# Patient Record
Sex: FEMALE
[De-identification: names, ages, dates, MRNs, and addresses within clinical notes are randomized per-mention and may not be internally consistent; named-entity substitution may affect disease eponyms.]

---

## 2017-02-11 ENCOUNTER — HOSPITAL ENCOUNTER (EMERGENCY)
Dept: HOSPITAL 5 - ED | Age: 62
Discharge: LEFT BEFORE DISCHARGE | End: 2017-02-11
Payer: MEDICAID

## 2017-02-11 VITALS — DIASTOLIC BLOOD PRESSURE: 109 MMHG | SYSTOLIC BLOOD PRESSURE: 154 MMHG

## 2017-02-11 DIAGNOSIS — R53.1: Primary | ICD-10-CM

## 2017-02-11 DIAGNOSIS — R11.10: ICD-10-CM

## 2017-02-11 DIAGNOSIS — Z53.21: ICD-10-CM

## 2017-05-13 ENCOUNTER — HOSPITAL ENCOUNTER (EMERGENCY)
Dept: HOSPITAL 5 - ED | Age: 62
Discharge: HOME | End: 2017-05-13
Payer: MEDICAID

## 2017-05-13 VITALS — SYSTOLIC BLOOD PRESSURE: 130 MMHG | DIASTOLIC BLOOD PRESSURE: 79 MMHG
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
## 2017-05-13 DIAGNOSIS — L29.9: ICD-10-CM

## 2017-05-13 DIAGNOSIS — L03.90: Primary | ICD-10-CM

## 2017-05-13 DIAGNOSIS — L30.9: ICD-10-CM

## 2017-05-13 PROCEDURE — 99282 EMERGENCY DEPT VISIT SF MDM: CPT

## 2017-05-13 NOTE — EMERGENCY DEPARTMENT REPORT
ED Animal Bite HPI





- General


Chief Complaint: Animal Bite


Stated Complaint: SEVERE SKIN CONDITION


Time Seen by Provider: 05/13/17 11:11


Source: patient


Mode of arrival: Ambulatory


Limitations: No Limitations





- History of Present Illness


Initial Comments: 





Patient here reported that she has bug bites all over.  She said it started 1 

month ago.  Complain of itching.  Patient said his arms, round worms and whip 

worms.  She was referred to a dermatologist in the past that she did not follow-

up.  Eyes any respiratory symptoms to include cough, wheezing, stridor, 

shortness of breath or chest pain.  Denies any difficulty swallowing or sore 

throat.  Denies any fever or chill or chest pain.  Denies any nausea vomiting.  

She denies pain at present.


MD Complaint: animal bite


-: month(s)


Location: other (generalized.)


Animal: other (unknown bugs)


Animal Control Notified: No


Description: unknown animal (drugs), immunizations UTD, appeared well


Mechanism: bite, scratch


Severity scale (0 -10): 0


Context: possible exposure while a


Associated Symptoms: erythema, rash.  denies: discharge from wound, bleeding, 

fever, chills, loss of consciousness, cough, headache, diaphoresis, shortness 

of breath


Treatments Prior to Arrival: antibiotic ointment





- Related Data


Patient Tetanus UTD: Yes


 Home Medications











 Medication  Instructions  Recorded  Confirmed  Last Taken


 


ALPRAZolam [Xanax TAB] 0.5 mg PO BID PRN 07/23/15 01/18/16 1 Day Ago








 Previous Rx's











 Medication  Instructions  Recorded  Last Taken  Type


 


Hydromorphone HCl [Dilaudid] 4 mg PO QID PRN #4 tablet 01/18/16 Unknown Rx


 


Oxycodone HCl [Oxycontin] 30 mg PO BID PRN #4 tab.er.12h 01/18/16 Unknown Rx


 


Promethazine [Phenergan TAB] 25 mg PO Q6HR PRN #10 tab 01/18/16 Unknown Rx


 


Ibuprofen [Motrin] 600 mg PO Q8H PRN #40 tablet 04/12/16 Unknown Rx


 


traMADol [Ultram] 50 mg PO Q6HR PRN #20 tablet 04/12/16 Unknown Rx


 


Sulfamethoxazole/Trimethoprim 1 each PO BID #20 tablet 11/29/16 Unknown Rx





[Bactrim DS TAB]    


 


Famotidine [Pepcid] 10 mg PO BID PRN #30 tablet 01/27/17 Unknown Rx


 


Hydrocortisone 1% [Hydrocortisone 1 applicatio TP TID #1 tube 01/27/17 Unknown 

Rx





1% CREAM]    


 


Hydroxyzine HCl 25 mg PO Q8H PRN #25 tablet 01/27/17 Unknown Rx


 


Ibuprofen [Motrin] 400 mg PO Q8H PRN #20 tablet 01/27/17 Unknown Rx


 


Mupirocin [Bactroban 2% CREAM] 1 applicatio TP TID #1 cream 01/27/17 Unknown Rx


 


Permethrin [Lice Cream Rinse] 120 ml TP QDAY #1 liquid 01/27/17 Unknown Rx


 


Sulfamethoxazole/Trimethoprim 1 each PO BID #14 tablet 01/27/17 Unknown Rx





[Bactrim DS TAB]    


 


Cephalexin [Keflex] 500 mg PO Q8HR #30 cap 05/13/17 Unknown Rx


 


Hydroxyzine HCl 25 mg PO Q8H PRN #21 tablet 05/13/17 Unknown Rx


 


predniSONE [Deltasone] 50 mg PO QDAY #5 tab 05/13/17 Unknown Rx











 Allergies











Allergy/AdvReac Type Severity Reaction Status Date / Time


 


No Known Allergies Allergy   Verified 05/13/17 09:44














ED Review of Systems


ROS: 


Stated complaint: SEVERE SKIN CONDITION


Other details as noted in HPI





Comment: All other systems reviewed and negative


Constitutional: denies: chills, fever


ENT: denies: ear pain, throat pain, dental pain, hearing loss, congestion


Respiratory: no symptoms reported


Cardiovascular: denies: chest pain, palpitations, dyspnea on exertion, edema, 

syncope


Endocrine: no symptoms reported


Gastrointestinal: denies: abdominal pain, nausea, vomiting


Musculoskeletal: denies: back pain, arthralgia, myalgia


Skin: rash, pruritus


Neurological: denies: headache, weakness, numbness, paresthesias, confusion, 

abnormal gait, vertigo





ED Past Medical Hx





- Past Medical History


Previous Medical History?: Yes


Hx Liver Disease: Yes (HEPATITIS C)


Hx Seizures: Yes


Additional medical history: fibromyalgia





- Surgical History


Past Surgical History?: Yes


Hx Cholecystectomy: Yes


Additional Surgical History: right arm surgery





- Family History


Family history: no significant





- Social History


Smoking Status: Never Smoker


Substance Use Type: None





- Medications


Home Medications: 


 Home Medications











 Medication  Instructions  Recorded  Confirmed  Last Taken  Type


 


ALPRAZolam [Xanax TAB] 0.5 mg PO BID PRN 07/23/15 01/18/16 1 Day Ago History


 


Hydromorphone HCl [Dilaudid] 4 mg PO QID PRN #4 tablet 01/18/16  Unknown Rx


 


Oxycodone HCl [Oxycontin] 30 mg PO BID PRN #4 tab.er.12h 01/18/16  Unknown Rx


 


Promethazine [Phenergan TAB] 25 mg PO Q6HR PRN #10 tab 01/18/16  Unknown Rx


 


Ibuprofen [Motrin] 600 mg PO Q8H PRN #40 tablet 04/12/16  Unknown Rx


 


traMADol [Ultram] 50 mg PO Q6HR PRN #20 tablet 04/12/16  Unknown Rx


 


Sulfamethoxazole/Trimethoprim 1 each PO BID #20 tablet 11/29/16  Unknown Rx





[Bactrim DS TAB]     


 


Famotidine [Pepcid] 10 mg PO BID PRN #30 tablet 01/27/17  Unknown Rx


 


Hydrocortisone 1% [Hydrocortisone 1 applicatio TP TID #1 tube 01/27/17  Unknown 

Rx





1% CREAM]     


 


Hydroxyzine HCl 25 mg PO Q8H PRN #25 tablet 01/27/17  Unknown Rx


 


Ibuprofen [Motrin] 400 mg PO Q8H PRN #20 tablet 01/27/17  Unknown Rx


 


Mupirocin [Bactroban 2% CREAM] 1 applicatio TP TID #1 cream 01/27/17  Unknown Rx


 


Permethrin [Lice Cream Rinse] 120 ml TP QDAY #1 liquid 01/27/17  Unknown Rx


 


Sulfamethoxazole/Trimethoprim 1 each PO BID #14 tablet 01/27/17  Unknown Rx





[Bactrim DS TAB]     


 


Cephalexin [Keflex] 500 mg PO Q8HR #30 cap 05/13/17  Unknown Rx


 


Hydroxyzine HCl 25 mg PO Q8H PRN #21 tablet 05/13/17  Unknown Rx


 


predniSONE [Deltasone] 50 mg PO QDAY #5 tab 05/13/17  Unknown Rx














ED Physical Exam





- General


Limitations: No Limitations


General appearance: alert, in no apparent distress





- Head


Head exam: Present: atraumatic, normocephalic, normal inspection





- Eye


Eye exam: Present: normal appearance, PERRL, EOMI.  Absent: scleral icterus, 

conjunctival injection, periorbital swelling, periorbital tenderness


Pupils: Present: normal accommodation





- ENT


ENT exam: Present: normal exam, normal orophraynx, mucous membranes moist, TM's 

normal bilaterally, normal external ear exam





- Neck


Neck exam: Present: normal inspection, full ROM.  Absent: tenderness, 

lymphadenopathy





- Respiratory


Respiratory exam: Present: normal lung sounds bilaterally.  Absent: respiratory 

distress, chest wall tenderness





- Cardiovascular


Cardiovascular Exam: Present: regular rate, normal rhythm, normal heart sounds





- GI/Abdominal


GI/Abdominal exam: Present: soft, normal bowel sounds.  Absent: distended, 

tenderness, guarding, rebound, rigid





- Extremities Exam


Extremities exam: Present: normal inspection, full ROM, normal capillary 

refill.  Absent: tenderness, pedal edema, joint swelling, calf tenderness





- Neurological Exam


Neurological exam: Present: alert, oriented X3, normal gait





- Psychiatric


Psychiatric exam: Present: normal affect, normal mood





- Skin


Skin exam: Present: warm, dry, erythema





- Expanded Skin Exam


  ** Expanded


Type of lesion: Present: rash, bite/sting


Distribution of rash: generalized (generalized erythema areas noted to body 

surface.  Sparsely scattered in the areas.  No facial or neck involvement.)


Description of rash: Present: tenderness, erythematous.  Absent: swelling, 

vesicular, blisters, urticarial, crusting, discharge, fluctuant, indurated





ED Course


 Vital Signs











  05/13/17 05/13/17





  09:30 09:40


 


Temperature 97.9 F 97.9 F


 


Pulse Rate 68 68


 


Respiratory 18 18





Rate  


 


Blood Pressure  151/92


 


Blood Pressure 151/92 





[Right]  


 


O2 Sat by Pulse 95 95





Oximetry  














- Reevaluation(s)


Reevaluation #1: 





05/13/17 11:39


Patient stable in the emergency room.


Critical care attestation.: 


If time is entered above; I have spent that time in minutes in the direct care 

of this critically ill patient, excluding procedure time.








ED Disposition


Clinical Impression: 


 Dermatitis, Pruritic dermatitis





Cellulitis


Qualifiers:


 Site of cellulitis: unspecified site Qualified Code(s): L03.90 - Cellulitis, 

unspecified





Bug bite with infection


Qualifiers:


 Encounter type: subsequent encounter Qualified Code(s): W57.XXXD - Bitten or 

stung by nonvenomous insect and other nonvenomous arthropods, subsequent 

encounter





Disposition: DISCHARGED TO HOME OR SELFCARE


Is pt being admited?: No


Does the pt Need Aspirin: No


Condition: Stable


Instructions:  Animal Bite (ED), Contact Dermatitis (ED), Itchy Skin (ED)


Additional Instructions: 


Please keep affected area clean and dry.


Take antibiotic as prescribed.


 Follow Up with dermatologist as referred.


Prescriptions: 


Cephalexin [Keflex] 500 mg PO Q8HR #30 cap


Hydroxyzine HCl 25 mg PO Q8H PRN #21 tablet


 PRN Reason: Itching


predniSONE [Deltasone] 50 mg PO QDAY #5 tab


Referrals: 


PRIMARY CARE,MD [Primary Care Provider] - 3-5 Days





ED Medical Decision Making





- Medical Decision Making





ED course: I discussed with  patient treatment plan and diagnosis. I discussed  

that she will need to follow-up with dermatologist we will refer her to.  

Patient with acute rash that appears from bug bite some areas are cellulitic.  

Patient was understanding of discharge instruction and discharged home with 

prescription for prednisone, Keflex and hydroxyzine.

## 2018-08-17 ENCOUNTER — HOSPITAL ENCOUNTER (EMERGENCY)
Dept: HOSPITAL 5 - ED | Age: 63
Discharge: LEFT BEFORE DISCHARGE | End: 2018-08-17
Payer: MEDICAID

## 2018-08-17 DIAGNOSIS — Z53.21: ICD-10-CM

## 2018-08-17 DIAGNOSIS — R03.0: Primary | ICD-10-CM

## 2018-09-20 ENCOUNTER — HOSPITAL ENCOUNTER (EMERGENCY)
Dept: HOSPITAL 5 - ED | Age: 63
Discharge: HOME | End: 2018-09-20
Payer: MEDICAID

## 2018-09-20 VITALS — SYSTOLIC BLOOD PRESSURE: 150 MMHG | DIASTOLIC BLOOD PRESSURE: 60 MMHG
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
## 2018-09-20 DIAGNOSIS — S63.502A: Primary | ICD-10-CM

## 2018-09-20 DIAGNOSIS — F12.10: ICD-10-CM

## 2018-09-20 DIAGNOSIS — M79.7: ICD-10-CM
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
## 2018-09-20 DIAGNOSIS — Z90.49: ICD-10-CM

## 2018-09-20 DIAGNOSIS — Z87.891: ICD-10-CM

## 2018-09-20 NOTE — XRAY REPORT
FINAL REPORT



EXAM:  XR WRIST 2V LT



HISTORY:  s/p fall hurt wrist 



COMPARISONS:  None.



FINDINGS:  

AP and lateral views left wrist 



Os ulnar styloidium. Ulnar styloid lucencies. There is a 4

millimeter lucency in the center of the lunate bone. No

chondrocalcinosis, periosteal reaction or erosive arthropathy

identified. Carpal arcs are intact. No acute fracture or gross

malalignment. 



IMPRESSION:  

No acute fracture or gross malalignment. 



Ulnar styloid findings may be sequela of prior fracture.

## 2018-09-20 NOTE — EMERGENCY DEPARTMENT REPORT
ED Upper Extremity Inj HPI





- General


Chief Complaint: Extremity Injury, Upper


Stated Complaint: WRIST PAIN


Source: patient


Mode of arrival: Ambulatory


Limitations: No Limitations





- History of Present Illness


Initial Comments: 





This is a 62-year-old female that presents with left wrist pain status post 

fall yesterday.  Patient states she was tripped over an extension cord at home 

and landed on her left wrist.  Her wrist twisted on landing.  She reports 

swelling in to lateral side of her wrist with pain with range of motion.  

Patient reports pain is 10 out of 10 on pain scale and worse with movement.  

She denies fever, numbness or tingling, deformity, and erythema.


MD Complaint: Injury to:: left, wrist


Onset/Timin


-: days(s)


Other Injuries: none


Handedness: right


Place: home


Severity scale (0 -10): 10


Improves With: none


Worsens With: movement of extremity


Context: fall


Associated Symptoms: denies other symptoms


Treatments Prior to Arrival: cold therapy





- Related Data


 Home Medications











 Medication  Instructions  Recorded  Confirmed  Last Taken


 


ALPRAZolam [Xanax TAB] 0.5 mg PO BID PRN 07/23/15 01/18/16 1 Day Ago





    ~16








 Previous Rx's











 Medication  Instructions  Recorded  Last Taken  Type


 


Hydromorphone HCl [Dilaudid] 4 mg PO QID PRN #4 tablet 16 Unknown Rx


 


Oxycodone HCl [Oxycontin] 30 mg PO BID PRN #4 tab.er.12h 16 Unknown Rx


 


Promethazine [Phenergan TAB] 25 mg PO Q6HR PRN #10 tab 16 Unknown Rx


 


Ibuprofen [Motrin] 600 mg PO Q8H PRN #40 tablet 16 Unknown Rx


 


traMADol [Ultram] 50 mg PO Q6HR PRN #20 tablet 16 Unknown Rx


 


Sulfamethoxazole/Trimethoprim 1 each PO BID #20 tablet 16 Unknown Rx





[Bactrim DS TAB]    


 


Famotidine [Pepcid] 10 mg PO BID PRN #30 tablet 17 Unknown Rx


 


Hydrocortisone 1% [Hydrocortisone 1 applicatio TP TID #1 tube 17 Unknown 

Rx





1% CREAM]    


 


Ibuprofen [Motrin] 400 mg PO Q8H PRN #20 tablet 17 Unknown Rx


 


Mupirocin [Bactroban 2% CREAM] 1 applicatio TP TID #1 cream 17 Unknown Rx


 


Permethrin [Lice Cream Rinse] 120 ml TP QDAY #1 liquid 17 Unknown Rx


 


Sulfamethoxazole/Trimethoprim 1 each PO BID #14 tablet 17 Unknown Rx





[Bactrim DS TAB]    


 


hydrOXYzine HCl [Hydroxyzine HCl] 25 mg PO Q8H PRN #25 tablet 17 Unknown 

Rx


 


cephALEXin [Keflex] 500 mg PO Q8HR #30 cap 17 Unknown Rx


 


hydrOXYzine HCl [Hydroxyzine HCl] 25 mg PO Q8H PRN #21 tablet 17 Unknown 

Rx


 


predniSONE [Deltasone] 50 mg PO QDAY #5 tab 17 Unknown Rx


 


Ibuprofen [Motrin 800 MG tab] 800 mg PO Q8HR PRN #12 tablet 18 Unknown Rx











 Allergies











Allergy/AdvReac Type Severity Reaction Status Date / Time


 


No Known Allergies Allergy   Verified 17 09:44














ED Review of Systems


ROS: 


Stated complaint: WRIST PAIN


Other details as noted in HPI





Constitutional: denies: chills, fever


Respiratory: denies: cough, shortness of breath, wheezing


Cardiovascular: denies: chest pain, palpitations





ED Past Medical Hx





- Past Medical History


Previous Medical History?: Yes


Hx Liver Disease: Yes (HEPATITIS C)


Hx Seizures: Yes


Additional medical history: fibromyalgia





- Surgical History


Past Surgical History?: Yes


Hx Cholecystectomy: Yes


Additional Surgical History: right arm surgery





- Social History


Smoking Status: Former Smoker


Substance Use Type: Marijuana





- Medications


Home Medications: 


 Home Medications











 Medication  Instructions  Recorded  Confirmed  Last Taken  Type


 


ALPRAZolam [Xanax TAB] 0.5 mg PO BID PRN 07/23/15 01/18/16 1 Day Ago History





    ~16 


 


Hydromorphone HCl [Dilaudid] 4 mg PO QID PRN #4 tablet 16  Unknown Rx


 


Oxycodone HCl [Oxycontin] 30 mg PO BID PRN #4 tab.er.12h 16  Unknown Rx


 


Promethazine [Phenergan TAB] 25 mg PO Q6HR PRN #10 tab 16  Unknown Rx


 


Ibuprofen [Motrin] 600 mg PO Q8H PRN #40 tablet 16  Unknown Rx


 


traMADol [Ultram] 50 mg PO Q6HR PRN #20 tablet 16  Unknown Rx


 


Sulfamethoxazole/Trimethoprim 1 each PO BID #20 tablet 16  Unknown Rx





[Bactrim DS TAB]     


 


Famotidine [Pepcid] 10 mg PO BID PRN #30 tablet 17  Unknown Rx


 


Hydrocortisone 1% [Hydrocortisone 1 applicatio TP TID #1 tube 17  Unknown 

Rx





1% CREAM]     


 


Ibuprofen [Motrin] 400 mg PO Q8H PRN #20 tablet 17  Unknown Rx


 


Mupirocin [Bactroban 2% CREAM] 1 applicatio TP TID #1 cream 17  Unknown Rx


 


Permethrin [Lice Cream Rinse] 120 ml TP QDAY #1 liquid 17  Unknown Rx


 


Sulfamethoxazole/Trimethoprim 1 each PO BID #14 tablet 17  Unknown Rx





[Bactrim DS TAB]     


 


hydrOXYzine HCl [Hydroxyzine HCl] 25 mg PO Q8H PRN #25 tablet 17  Unknown 

Rx


 


cephALEXin [Keflex] 500 mg PO Q8HR #30 cap 17  Unknown Rx


 


hydrOXYzine HCl [Hydroxyzine HCl] 25 mg PO Q8H PRN #21 tablet 17  Unknown 

Rx


 


predniSONE [Deltasone] 50 mg PO QDAY #5 tab 17  Unknown Rx


 


Ibuprofen [Motrin 800 MG tab] 800 mg PO Q8HR PRN #12 tablet 18  Unknown Rx














ED Physical Exam





- General


Limitations: No Limitations


General appearance: alert, in no apparent distress





- Respiratory


Respiratory exam: Present: normal lung sounds bilaterally.  Absent: respiratory 

distress





- Cardiovascular


Cardiovascular Exam: Present: regular rate, normal rhythm.  Absent: systolic 

murmur, diastolic murmur, rubs, gallop





- GI/Abdominal


GI/Abdominal exam: Present: soft, normal bowel sounds





- Expanded Upper Extremity Exam


  ** Left


Shoulder Exam: Present: normal inspection, full ROM


Upper Arm exam: Present: normal inspection, full ROM


Elbow exam: Present: normal inspection, full ROM


Forearm Wrist exam: Present: normal inspection, full ROM


Hand Wrist exam: Present: tenderness (tenderness and swelling over scaphoid), 

swelling.  Absent: full ROM (Limited range of motion secondary pain), laceration

, ecchymosis, deformity, crepidus, dislocation, erythema, amputation, nail 

avulsion, subungual hematoma


Neuro motor exam: Present: wrist extension intact, thumb opposition intact, 

thumb IP flexion intact, thumb adduction intact, fingers 2-5 abduction intact





- Neurological Exam


Neurological exam: Present: alert, oriented X3





- Psychiatric


Psychiatric exam: Present: normal affect, normal mood





- Skin


Skin exam: Present: warm, dry, intact, normal color.  Absent: rash





ED Course





 Vital Signs











  18





  03:35


 


Temperature 98.6 F


 


Pulse Rate 71


 


Respiratory 16





Rate 


 


Blood Pressure 151/66


 


O2 Sat by Pulse 97





Oximetry 














ED Medical Decision Making





- Radiology Data


Radiology results: report reviewed, image reviewed





- Medical Decision Making





Patient was examined by me in the emergency room.  


Vitals are normal and patient is in no acute distress.  


Obtained x-ray of left wrist.  X-rays dictated by radiologist and report 

reviewed by myself.


No acute process or gross malaligment.  Ulnar styloid findings may be sequela 

of prior fracture.


Patient informed of results.  


Start ibuprofen for pain.  Reviewed RX history on [facility], patient received 120 

oxycodone pills on 18.  She was informed to take prescribed medication for 

pain.


Referral to orthopedic surgery for continuous care. 


Plan discussed with patient to discharge home and treat outpatient. She agrees 

with ER plan. 


Patient discharged home in stable condition. 


Follow up with PCP in 2-3 days.


Critical care attestation.: 


If time is entered above; I have spent that time in minutes in the direct care 

of this critically ill patient, excluding procedure time.








ED Disposition


Clinical Impression: 


 Left wrist pain





Fall


Qualifiers:


 Encounter type: initial encounter Qualified Code(s): W19.XXXA - Unspecified 

fall, initial encounter





Sprain of wrist, left


Qualifiers:


 Encounter type: initial encounter Qualified Code(s): S63.502A - Unspecified 

sprain of left wrist, initial encounter





Disposition:  TO HOME OR SELFCARE


Is pt being admited?: No


Does the pt Need Aspirin: No


Condition: Stable


Instructions:  Wrist Injury (ED), Wrist Sprain (ED)


Additional Instructions: 


Rest


Use ice or heat on affected area for 20 minutes and off for 2 hours.


Take pain medication every 6 hours as needed for pain.


Follow up with Primary Care Provider in 2-3 days.


Prescriptions: 


Ibuprofen [Motrin 800 MG tab] 800 mg PO Q8HR PRN #12 tablet


 PRN Reason: Pain, Moderate (4-6)


Referrals: 


COURTNEY RDZ MD [Staff Physician] - 3-5 Days


[facility] [Provider Group] - 3-5 Days


TATIANA LEAHY JR, MD [Staff Physician] - 3-5 Days


Forms:  Work/School Release Form(ED)


Time of Disposition: 09:29


Print Language: ENGLISH

## 2019-06-24 ENCOUNTER — HOSPITAL ENCOUNTER (EMERGENCY)
Dept: HOSPITAL 5 - ED | Age: 64
Discharge: HOME | End: 2019-06-24
Payer: MEDICAID

## 2019-06-24 VITALS — DIASTOLIC BLOOD PRESSURE: 73 MMHG | SYSTOLIC BLOOD PRESSURE: 147 MMHG

## 2019-06-24 DIAGNOSIS — Z77.22: ICD-10-CM

## 2019-06-24 DIAGNOSIS — J01.90: ICD-10-CM

## 2019-06-24 DIAGNOSIS — Z79.899: ICD-10-CM

## 2019-06-24 DIAGNOSIS — Z90.49: ICD-10-CM

## 2019-06-24 DIAGNOSIS — J20.9: Primary | ICD-10-CM

## 2019-06-24 DIAGNOSIS — I10: ICD-10-CM

## 2019-06-24 PROCEDURE — 99283 EMERGENCY DEPT VISIT LOW MDM: CPT

## 2019-06-24 PROCEDURE — 94640 AIRWAY INHALATION TREATMENT: CPT

## 2019-06-24 PROCEDURE — 71046 X-RAY EXAM CHEST 2 VIEWS: CPT

## 2019-06-24 NOTE — XRAY REPORT
ROUTINE CHEST, TWO VIEWS:



HISTORY:  Productive cough, wheezing.



The trachea, heart, mediastinal contour, lung fields and bony thorax 

are unremarkable. Moderate thoracic spondylosis is noted.



IMPRESSION:

Unremarkable chest x-ray.

## 2019-06-24 NOTE — EMERGENCY DEPARTMENT REPORT
- General


Chief Complaint: Upper Respiratory Infection


Stated Complaint: COUGH


Time Seen by Provider: 06/24/19 09:59


Source: patient


Mode of arrival: Ambulatory


Limitations: No Limitations





- History of Present Illness


Initial Comments: 





Patient is a 63-year-old  female who has a past medical history of 

significant secondhand smoke exposure as well as hypertension who is presenting 

with cough and congestion.  Patient has had a cough for the last 3 days.  

Patient states she has been around several children remove the nail.  Patient's 

cough productive of green sputum.  Patient is reporting shortness of breath and 

wheezing with a frontal headache and nasal congestion.  Patient states that she 

has had some gagging and nausea vomiting as well.


Severity scale (0 -10): 4


Quality: dull, aching (mid face)


Consistency: constant





- Related Data


                                Home Medications











 Medication  Instructions  Recorded  Confirmed  Last Taken


 


ALPRAZolam [Xanax TAB] 0.5 mg PO BID PRN 07/23/15 01/18/16 1 Day Ago





    ~01/17/16








                                  Previous Rx's











 Medication  Instructions  Recorded  Last Taken  Type


 


Hydromorphone HCl [Dilaudid] 4 mg PO QID PRN #4 tablet 01/18/16 Unknown Rx


 


Oxycodone HCl [oxyCONTIN ER] 30 mg PO BID PRN #4 tab.er.12h 01/18/16 Unknown Rx


 


Promethazine [Phenergan TAB] 25 mg PO Q6HR PRN #10 tab 01/18/16 Unknown Rx


 


Ibuprofen [Motrin] 600 mg PO Q8H PRN #40 tablet 04/12/16 Unknown Rx


 


traMADol [Ultram] 50 mg PO Q6HR PRN #20 tablet 04/12/16 Unknown Rx


 


Sulfamethoxazole/Trimethoprim 1 each PO BID #20 tablet 11/29/16 Unknown Rx





[Bactrim DS TAB]    


 


Famotidine [Pepcid] 10 mg PO BID PRN #30 tablet 01/27/17 Unknown Rx


 


Hydrocortisone 1% [Hydrocortisone 1 applicatio TP TID #1 tube 01/27/17 Unknown 

Rx





1% CREAM]    


 


Ibuprofen [Motrin] 400 mg PO Q8H PRN #20 tablet 01/27/17 Unknown Rx


 


Mupirocin [Bactroban 2% CREAM] 1 applicatio TP TID #1 cream 01/27/17 Unknown Rx


 


Permethrin [Lice Cream Rinse] 120 ml TP QDAY #1 liquid 01/27/17 Unknown Rx


 


Sulfamethoxazole/Trimethoprim 1 each PO BID #14 tablet 01/27/17 Unknown Rx





[Bactrim DS TAB]    


 


hydrOXYzine HCl [Hydroxyzine HCl] 25 mg PO Q8H PRN #25 tablet 01/27/17 Unknown 

Rx


 


cephALEXin [Keflex] 500 mg PO Q8HR #30 cap 05/13/17 Unknown Rx


 


hydrOXYzine HCl [Hydroxyzine HCl] 25 mg PO Q8H PRN #21 tablet 05/13/17 Unknown 

Rx


 


predniSONE [Deltasone] 50 mg PO QDAY #5 tab 05/13/17 Unknown Rx


 


Ibuprofen [Motrin 800 MG tab] 800 mg PO Q8HR PRN #12 tablet 09/20/18 Unknown Rx


 


Clindamycin [Clindamycin CAP] 300 mg PO Q8H 10 Days #30 cap 01/05/19 Unknown Rx


 


Permethrin 5% [Acticin 5% CREAM] 1 applicatio TP ONCE 1 Days #1 tube 01/05/19 

Unknown Rx


 


hydrOXYzine HCL [Atarax] 25 mg PO Q6HR PRN #12 tablet 01/05/19 Unknown Rx


 


ALBUTEROL Inhaler (OR & NICU) 2 puff IH QID PRN #1 inhalation 06/24/19 Unknown 

Rx





[ProAir HFA Inhaler]    


 


Amoxicillin/Potassium Clav 1 each PO BID #14 tablet 06/24/19 Unknown Rx





[Augmentin 875-125 Tablet]    


 


Benzonatate [Tessalon Perles] 100 mg PO Q8HR #10 capsule 06/24/19 Unknown Rx


 


Fluticasone [Flonase] 1 spray NS QDAY #1 bottle 06/24/19 Unknown Rx


 


predniSONE [Deltasone] 20 mg PO QDAY #5 tab 06/24/19 Unknown Rx











                                    Allergies











Allergy/AdvReac Type Severity Reaction Status Date / Time


 


No Known Allergies Allergy   Verified 06/24/19 09:29














ED Review of Systems


ROS: 


Stated complaint: COUGH


Other details as noted in HPI





Comment: All other systems reviewed and negative





ED Past Medical Hx





- Past Medical History


Previous Medical History?: Yes


Hx Hypertension: Yes


Hx Liver Disease: Yes (HEPATITIS C)


Hx Seizures: Yes


Additional medical history: fibromyalgia





- Surgical History


Past Surgical History?: Yes


Hx Cholecystectomy: Yes


Additional Surgical History: right arm surgery





- Social History


Smoking Status: Never Smoker


Substance Use Type: None





- Medications


Home Medications: 


                                Home Medications











 Medication  Instructions  Recorded  Confirmed  Last Taken  Type


 


ALPRAZolam [Xanax TAB] 0.5 mg PO BID PRN 07/23/15 01/18/16 1 Day Ago History





    ~01/17/16 


 


Hydromorphone HCl [Dilaudid] 4 mg PO QID PRN #4 tablet 01/18/16  Unknown Rx


 


Oxycodone HCl [oxyCONTIN ER] 30 mg PO BID PRN #4 tab.er.12h 01/18/16  Unknown Rx


 


Promethazine [Phenergan TAB] 25 mg PO Q6HR PRN #10 tab 01/18/16  Unknown Rx


 


Ibuprofen [Motrin] 600 mg PO Q8H PRN #40 tablet 04/12/16  Unknown Rx


 


traMADol [Ultram] 50 mg PO Q6HR PRN #20 tablet 04/12/16  Unknown Rx


 


Sulfamethoxazole/Trimethoprim 1 each PO BID #20 tablet 11/29/16  Unknown Rx





[Bactrim DS TAB]     


 


Famotidine [Pepcid] 10 mg PO BID PRN #30 tablet 01/27/17  Unknown Rx


 


Hydrocortisone 1% [Hydrocortisone 1 applicatio TP TID #1 tube 01/27/17  Unknown 

Rx





1% CREAM]     


 


Ibuprofen [Motrin] 400 mg PO Q8H PRN #20 tablet 01/27/17  Unknown Rx


 


Mupirocin [Bactroban 2% CREAM] 1 applicatio TP TID #1 cream 01/27/17  Unknown Rx


 


Permethrin [Lice Cream Rinse] 120 ml TP QDAY #1 liquid 01/27/17  Unknown Rx


 


Sulfamethoxazole/Trimethoprim 1 each PO BID #14 tablet 01/27/17  Unknown Rx





[Bactrim DS TAB]     


 


hydrOXYzine HCl [Hydroxyzine HCl] 25 mg PO Q8H PRN #25 tablet 01/27/17  Unknown 

Rx


 


cephALEXin [Keflex] 500 mg PO Q8HR #30 cap 05/13/17  Unknown Rx


 


hydrOXYzine HCl [Hydroxyzine HCl] 25 mg PO Q8H PRN #21 tablet 05/13/17  Unknown 

Rx


 


predniSONE [Deltasone] 50 mg PO QDAY #5 tab 05/13/17  Unknown Rx


 


Ibuprofen [Motrin 800 MG tab] 800 mg PO Q8HR PRN #12 tablet 09/20/18  Unknown Rx


 


Clindamycin [Clindamycin CAP] 300 mg PO Q8H 10 Days #30 cap 01/05/19  Unknown Rx


 


Permethrin 5% [Acticin 5% CREAM] 1 applicatio TP ONCE 1 Days #1 tube 01/05/19  

Unknown Rx


 


hydrOXYzine HCL [Atarax] 25 mg PO Q6HR PRN #12 tablet 01/05/19  Unknown Rx


 


ALBUTEROL Inhaler (OR & NICU) 2 puff IH QID PRN #1 inhalation 06/24/19  Unknown 

Rx





[ProAir HFA Inhaler]     


 


Amoxicillin/Potassium Clav 1 each PO BID #14 tablet 06/24/19  Unknown Rx





[Augmentin 875-125 Tablet]     


 


Benzonatate [Tessalon Perles] 100 mg PO Q8HR #10 capsule 06/24/19  Unknown Rx


 


Fluticasone [Flonase] 1 spray NS QDAY #1 bottle 06/24/19  Unknown Rx


 


predniSONE [Deltasone] 20 mg PO QDAY #5 tab 06/24/19  Unknown Rx














ED Physical Exam





- General


Limitations: No Limitations


General appearance: alert, in no apparent distress





- Head


Head exam: Present: atraumatic, normocephalic





- Expanded Head Exam


  ** Expanded





                            __________________________














                            __________________________





 1 - tenderness








- Eye


Eye exam: Present: normal appearance





- ENT


ENT exam: Present: mucous membranes moist





- Neck


Neck exam: Present: normal inspection





- Respiratory


Respiratory exam: Present: normal lung sounds bilaterally, respiratory distress,

 wheezes, rhonchi.  Absent: rales, stridor, accessory muscle use





- Cardiovascular


Cardiovascular Exam: Present: regular rate, normal rhythm, normal heart sounds. 

 Absent: systolic murmur, diastolic murmur, rubs, gallop





- GI/Abdominal


GI/Abdominal exam: Present: soft, normal bowel sounds.  Absent: distended, 

tenderness, guarding, rebound





- Extremities Exam


Extremities exam: Present: normal inspection





- Back Exam


Back exam: Present: normal inspection





- Neurological Exam


Neurological exam: Present: alert, oriented X3





- Psychiatric


Psychiatric exam: Present: normal affect, normal mood





- Skin


Skin exam: Present: warm, dry, intact, normal color.  Absent: rash





ED Course





                                   Vital Signs











  06/24/19





  09:29


 


Temperature 97.8 F


 


Pulse Rate 78


 


Respiratory 18





Rate 


 


Blood Pressure 147/73


 


O2 Sat by Pulse 96





Oximetry 














ED Medical Decision Making





- Radiology Data





Chest x-ray shows no acute process





- Medical Decision Making





Patient received a nebulized treatment of albuterol and Atrovent which did 

improve her wheezing.  At time of discharge wheezing has resolved.  Patient 

likely with acute bronchitis.  Patient has a significant secondhand smoke 

exposure as her  smokes about 2 packs of cigarettes daily in the house.  

Patient treated as a smoker's bronchitis and be started on Augmentin.  This also

 will cover for her likely acute sinusitis as well.  Patient given several days 

of prednisone and as well as Flonase and albuterol inhaler.  Patient discharged 

in stable condition.


Critical care attestation.: 


If time is entered above; I have spent that time in minutes in the direct care 

of this critically ill patient, excluding procedure time.








ED Disposition


Clinical Impression: 


 Acute bronchitis with bronchospasm, Acute sinusitis





Disposition: DC-01 TO HOME OR SELFCARE


Is pt being admited?: No


Does the pt Need Aspirin: No


Condition: Stable


Instructions:  Acute Bronchitis (ED), Reactive Airways Disease (ED), Sinusitis 

(ED)


Referrals: 


DAWN QUEVEDO MD [Primary Care Provider] - 3-5 Days


Time of Disposition: 11:34

## 2019-07-08 ENCOUNTER — HOSPITAL ENCOUNTER (EMERGENCY)
Dept: HOSPITAL 5 - ED | Age: 64
Discharge: HOME | End: 2019-07-08
Payer: MEDICAID

## 2019-07-08 VITALS — DIASTOLIC BLOOD PRESSURE: 59 MMHG | SYSTOLIC BLOOD PRESSURE: 108 MMHG

## 2019-07-08 DIAGNOSIS — J40: Primary | ICD-10-CM

## 2019-07-08 DIAGNOSIS — Z87.891: ICD-10-CM

## 2019-07-08 DIAGNOSIS — Z90.49: ICD-10-CM

## 2019-07-08 DIAGNOSIS — Z86.19: ICD-10-CM

## 2019-07-08 DIAGNOSIS — M79.7: ICD-10-CM

## 2019-07-08 DIAGNOSIS — I10: ICD-10-CM

## 2019-07-08 DIAGNOSIS — Z79.899: ICD-10-CM

## 2019-07-08 LAB
ALBUMIN SERPL-MCNC: 3.4 G/DL (ref 3.9–5)
ALT SERPL-CCNC: 84 UNITS/L (ref 7–56)
BASOPHILS # (AUTO): 0.1 K/MM3 (ref 0–0.1)
BASOPHILS NFR BLD AUTO: 0.4 % (ref 0–1.8)
BUN SERPL-MCNC: 13 MG/DL (ref 7–17)
BUN/CREAT SERPL: 22 %
CALCIUM SERPL-MCNC: 9 MG/DL (ref 8.4–10.2)
EOSINOPHIL # BLD AUTO: 0 K/MM3 (ref 0–0.4)
EOSINOPHIL NFR BLD AUTO: 0.1 % (ref 0–4.3)
HCT VFR BLD CALC: 41.7 % (ref 30.3–42.9)
HEMOLYSIS INDEX: 9
HGB BLD-MCNC: 14.1 GM/DL (ref 10.1–14.3)
LYMPHOCYTES # BLD AUTO: 2.3 K/MM3 (ref 1.2–5.4)
LYMPHOCYTES NFR BLD AUTO: 12.1 % (ref 13.4–35)
MCHC RBC AUTO-ENTMCNC: 34 % (ref 30–34)
MCV RBC AUTO: 92 FL (ref 79–97)
MONOCYTES # (AUTO): 1.4 K/MM3 (ref 0–0.8)
MONOCYTES % (AUTO): 7.5 % (ref 0–7.3)
PLATELET # BLD: 92 K/MM3 (ref 140–440)
RBC # BLD AUTO: 4.53 M/MM3 (ref 3.65–5.03)

## 2019-07-08 PROCEDURE — 71046 X-RAY EXAM CHEST 2 VIEWS: CPT

## 2019-07-08 PROCEDURE — 82140 ASSAY OF AMMONIA: CPT

## 2019-07-08 PROCEDURE — 71250 CT THORAX DX C-: CPT

## 2019-07-08 PROCEDURE — 84484 ASSAY OF TROPONIN QUANT: CPT

## 2019-07-08 PROCEDURE — 93010 ELECTROCARDIOGRAM REPORT: CPT

## 2019-07-08 PROCEDURE — 94640 AIRWAY INHALATION TREATMENT: CPT

## 2019-07-08 PROCEDURE — 83735 ASSAY OF MAGNESIUM: CPT

## 2019-07-08 PROCEDURE — 93005 ELECTROCARDIOGRAM TRACING: CPT

## 2019-07-08 PROCEDURE — 85025 COMPLETE CBC W/AUTO DIFF WBC: CPT

## 2019-07-08 PROCEDURE — 87040 BLOOD CULTURE FOR BACTERIA: CPT

## 2019-07-08 PROCEDURE — 80053 COMPREHEN METABOLIC PANEL: CPT

## 2019-07-08 PROCEDURE — 36415 COLL VENOUS BLD VENIPUNCTURE: CPT

## 2019-07-08 NOTE — XRAY REPORT
PA AND LATERAL CXR



HISTORY: Cough.



COMPARISON: 6/24/2019 



FINDINGS:

Cardiomediastinal silhouette: Normal cardiac size.  Normal mediastinal contours.

Lungs: Normal expansion.  Normal lung aeration.  No pleural effusions.  No pneumothorax.

Pulmonary vascularity: Normal.



Support hardware: None.



Additional findings: None.



IMPRESSION:

1. No acute cardiopulmonary process.



Signer Name: Laz Palmer MD 

Signed: 7/8/2019 2:59 PM

 Workstation Name: [id number]

## 2019-07-08 NOTE — EVENT NOTE
ED Screening Note


ED Screening Note: 





Presents for a cough a week 


+sputum production 


states she has SOB


+fever


 states she has dark urine 





PMHx HTN, Hep C 





This initial assessment/diagnostic orders/clinical plan/treatment(s) is/are 

subject to change based on patients health status, clinical progression and re-

assessment by fellow clinical providers in the ED. Further treatment and workup 

at subsequent clinical providers discretion. Patient/guardian urged not to elope

from the ED as their condition may be serious if not clinically assessed and 

managed. 





Initial orders include: sepsis protocol

## 2019-07-08 NOTE — EMERGENCY DEPARTMENT REPORT
ED General Adult HPI





- General


Chief complaint: Dyspnea/Respdistress


Stated complaint: GRACE


Time Seen by Provider: 19 13:54


Source: patient, RN notes reviewed, old records reviewed


Mode of arrival: Ambulatory


Limitations: No Limitations





- History of Present Illness


Initial comments: 


This is a pleasant 63-year-old female who was recently admitted to this 

emergency room for presumed bronchitis.  She was treated with steroids, and





Antibiotics, and nebulizer therapy.





Past medical history includes hypertension, fibromyalgia, hepatitis C, and 

reported history of chronic tobacco exposure.  Patient indicates that apparently

her ex- smokes quite a bit around her.  Patient presents to the ER today 

with a complaint of persistent cough, mucus production, coughing up blood, green

nasal discharge, and green mucus.  Symptoms have basically been constant since 

her recent evaluation.  She denies physical pain and a discrete area, but 

endorses some body aches.  Symptoms do not radiate anywhere, and did not have 

exacerbating or relieving factors that she is aware.  Positive fever, positive 

cough, positive mucus production.


-: Gradual, days(s)


Consistency: constant


Improves with: none


Worsens with: none





- Related Data


                                Home Medications











 Medication  Instructions  Recorded  Confirmed  Last Taken


 


Mv-Mn/Folic Acid/Calcium/Vit K 1 each PO DAILY 19 Unknown





[Women's 50 Plus Multivit Tab]    


 


Potassium 99 mg PO QDAY 19 Unknown








                                  Previous Rx's











 Medication  Instructions  Recorded  Last Taken  Type


 


Albuterol Sulfate [Proair 90 mcg IH Q4HR PRN #2 aer.pow.ba 19 Unknown Rx





Respiclick]    


 


Magnesium Oxide [Mag-Ox] 400 mg PO BID #28 tablet 19 Unknown Rx


 


levoFLOXacin [Levaquin] 750 mg PO QDAY #10 tablet 19 Unknown Rx











                                    Allergies











Allergy/AdvReac Type Severity Reaction Status Date / Time


 


No Known Allergies Allergy   Verified 19 09:29














ED Review of Systems


ROS: 


Stated complaint: GRACE


Other details as noted in HPI





Constitutional: fever, malaise, other


ENT: congestion


Respiratory: cough


Cardiovascular: denies: syncope


Gastrointestinal: denies: vomiting


Genitourinary: denies: dysuria


Musculoskeletal: arthralgia, myalgia


Skin: denies: lesions


Neurological: weakness


Psychiatric: anxiety





ED Past Medical Hx





- Past Medical History


Previous Medical History?: Yes


Hx Hypertension: Yes


Hx Liver Disease: Yes (HEPATITIS C)


Hx Seizures: Yes


Additional medical history: fibromyalgia





- Surgical History


Past Surgical History?: Yes


Hx Cholecystectomy: Yes


Additional Surgical History: right arm surgery





- Social History


Smoking Status: Never Smoker


Substance Use Type: None





- Medications


Home Medications: 


                                Home Medications











 Medication  Instructions  Recorded  Confirmed  Last Taken  Type


 


Albuterol Sulfate [Proair 90 mcg IH Q4HR PRN #2 aer.pow.ba 19  Unknown Rx





Respiclick]     


 


Magnesium Oxide [Mag-Ox] 400 mg PO BID #28 tablet 19  Unknown Rx


 


Mv-Mn/Folic Acid/Calcium/Vit K 1 each PO DAILY 19 Unknown History





[Women's 50 Plus Multivit Tab]     


 


Potassium 99 mg PO QDAY 19 Unknown History


 


levoFLOXacin [Levaquin] 750 mg PO QDAY #10 tablet 19  Unknown Rx














ED Physical Exam





- General


Limitations: No Limitations


General appearance: alert, anxious





- Head


Head exam: Present: atraumatic, normocephalic





- Eye


Eye exam: Present: normal appearance, EOMI.  Absent: nystagmus





- ENT


ENT exam: Present: normal exam, normal orophraynx, mucous membranes moist, 

normal external ear exam





- Neck


Neck exam: Present: normal inspection, full ROM.  Absent: tenderness, 

meningismus





- Respiratory


Respiratory exam: Present: rhonchi.  Absent: respiratory distress





- Cardiovascular


Cardiovascular Exam: Present: regular rate, normal rhythm, normal heart sounds. 

 Absent: bradycardia, tachycardia, irregular rhythm, systolic murmur, diastolic 

murmur, rubs, gallop





- GI/Abdominal


GI/Abdominal exam: Present: soft.  Absent: distended, tenderness, guarding, 

rebound, rigid, pulsatile mass





- Extremities Exam


Extremities exam: Present: normal inspection, full ROM, other (2+ pulses noted 

in the bilateral upper, lower extremities.  Compartments soft.  No long bony 

tenderness.  The pelvis is stable.).  Absent: calf tenderness





- Back Exam


Back exam: Present: normal inspection, full ROM.  Absent: tenderness, CVA 

tenderness (R), CVA tenderness (L), paraspinal tenderness, vertebral tenderness





- Neurological Exam


Neurological exam: Present: alert, oriented X3, other (Extraocular movements 

intact.  Tongue midline.  No facial droop.  Facial sensation intact to light 

touch in the V1, V2, V3 distribution bilaterally.  5 and 5 strength in 4 

extremities..  Sensation is intact to light touch in 4 extremities.).  Absent: 

motor sensory deficit





- Psychiatric


Psychiatric exam: Present: anxious





- Skin


Skin exam: Present: warm, dry, intact, normal color.  Absent: rash





ED Course


                                   Vital Signs











  19





  13:55 14:54 15:00


 


Temperature 100.9 F H  


 


Pulse Rate 65  74


 


Pulse Rate [   





Anterior   





Bilateral   





Throughout]   


 


Respiratory 24  20





Rate   


 


Respiratory   





Rate [Anterior   





Bilateral   





Throughout]   


 


Blood Pressure 143/78  


 


Blood Pressure   





[Left]   


 


O2 Sat by Pulse 92 94 95





Oximetry   














  19





  15:30 15:50 15:51


 


Temperature   


 


Pulse Rate 83  


 


Pulse Rate [  63 64





Anterior   





Bilateral   





Throughout]   


 


Respiratory 24  





Rate   


 


Respiratory  18 19





Rate [Anterior   





Bilateral   





Throughout]   


 


Blood Pressure   


 


Blood Pressure   





[Left]   


 


O2 Sat by Pulse 95  





Oximetry   














  19





  16:00 16:30 17:00


 


Temperature   


 


Pulse Rate 79 91 H 75


 


Pulse Rate [   





Anterior   





Bilateral   





Throughout]   


 


Respiratory 19 15 19





Rate   


 


Respiratory   





Rate [Anterior   





Bilateral   





Throughout]   


 


Blood Pressure   


 


Blood Pressure   





[Left]   


 


O2 Sat by Pulse 93 97 95





Oximetry   














  19





  17:14 17:30 18:00


 


Temperature 99.8 F H  


 


Pulse Rate 78 74 69


 


Pulse Rate [   





Anterior   





Bilateral   





Throughout]   


 


Respiratory 20 14 28 H





Rate   


 


Respiratory   





Rate [Anterior   





Bilateral   





Throughout]   


 


Blood Pressure  120/65 119/69


 


Blood Pressure 120/65  





[Left]   


 


O2 Sat by Pulse 95 94 93





Oximetry   














  19





  18:32 19:00


 


Temperature  


 


Pulse Rate  65


 


Pulse Rate [  





Anterior  





Bilateral  





Throughout]  


 


Respiratory  12





Rate  


 


Respiratory  





Rate [Anterior  





Bilateral  





Throughout]  


 


Blood Pressure 100/56 100/46


 


Blood Pressure  





[Left]  


 


O2 Sat by Pulse 95 96





Oximetry  














- Reevaluation(s)


Reevaluation #1: 





19 15:59


Differential diagnosis, including but not limited to: Bronchitis, 

bronchiectasis, pneumonia,  anxiety





Assessment and plan: 63-year-old female with low-grade temperature, question 

initial tachycardia, initially called as a code sepsis, based off of the history

 and physical, I suspect that the patient is experiencing the natural expected 

history of bronchitis.  She has a low-grade temperature, and a second EKG shows 

a sinus rhythm at 73 bpm.  Initial EKG suggested a flutter with variable 

ventricular rate, I suspect the initial EKG is an error.  Patient so far whenl I

 have evaluated her, has not demonstrated any episodes of tachycardia that I 

personally witness.  We will treat her symptoms, and obtain noncontrast CT scan 

of the chest to exclude large masses or lesions, and obvious infectious 

etiology.


The patient does not have any pulmonary embolism risk factors, and she is low 

risk by well's criteria.  Her leukocytosis is reviewed and appreciated, this is 

likely a stress reaction or related to recent steroid burst.  Currently, the 

patient does not meet sepsis criteria.  She was called as a code sepsis in 

triage, which I suspect is an error.





Blood cultures were sent prior to my evaluation.  Based off of the current 

history and physical, do not suspect bacteremia, or invasive bacterial illness.











19 16:26





Reevaluation #2: 





19 19:29


The patient is reassessed.  Fever has resolved.  She is sleeping comfortably and

 in no acute distress.  She has consumed 0.5 L of water without difficulty.  CT 

scan of the chest is reviewed and appreciated.





IMPRESSION: 1. Nodular infiltrate at the lung bases appears inflammatory. 

Atypical organisms should be included in the differential. 2. Suspected 

splenomegaly. 





We will continue albuterol as needed, expanded antibiotic coverage to include 

Levaquin to cover atypical organisms, the patient is counseled to follow up with

 an outpatient primary care doctor or pulmonologist.  She indicates that she is 

reliable to follow-up.





ED Medical Decision Making





- Lab Data


Result diagrams: 


                                 19 13:59





                                 19 13:59








                                   Vital Signs











  19





  13:55 15:50 15:51


 


Temperature 100.9 F H  


 


Pulse Rate 65  


 


Pulse Rate [  63 64





Anterior   





Bilateral   





Throughout]   


 


Respiratory 24  





Rate   


 


Respiratory  18 19





Rate [Anterior   





Bilateral   





Throughout]   


 


Blood Pressure 143/78  


 


O2 Sat by Pulse 92  





Oximetry   











                                   Lab Results











  19 Range/Units





  13:59 13:59 13:59 


 


WBC  19.0 H    (4.5-11.0)  K/mm3


 


RBC  4.53    (3.65-5.03)  M/mm3


 


Hgb  14.1    (10.1-14.3)  gm/dl


 


Hct  41.7    (30.3-42.9)  %


 


MCV  92    (79-97)  fl


 


MCH  31    (28-32)  pg


 


MCHC  34    (30-34)  %


 


RDW  14.2    (13.2-15.2)  %


 


Plt Count  92 L    (140-440)  K/mm3


 


Lymph % (Auto)  12.1 L    (13.4-35.0)  %


 


Mono % (Auto)  7.5 H    (0.0-7.3)  %


 


Eos % (Auto)  0.1    (0.0-4.3)  %


 


Baso % (Auto)  0.4    (0.0-1.8)  %


 


Lymph #  2.3    (1.2-5.4)  K/mm3


 


Mono #  1.4 H    (0.0-0.8)  K/mm3


 


Eos #  0.0    (0.0-0.4)  K/mm3


 


Baso #  0.1    (0.0-0.1)  K/mm3


 


Seg Neutrophils %  79.9 H    (40.0-70.0)  %


 


Seg Neutrophils #  15.2 H    (1.8-7.7)  K/mm3


 


Sodium   133 L   (137-145)  mmol/L


 


Potassium   3.8   (3.6-5.0)  mmol/L


 


Chloride   98.7   ()  mmol/L


 


Carbon Dioxide   23   (22-30)  mmol/L


 


Anion Gap   15   mmol/L


 


BUN   13   (7-17)  mg/dL


 


Creatinine   0.6 L   (0.7-1.2)  mg/dL


 


Estimated GFR   > 60   ml/min


 


BUN/Creatinine Ratio   22   %


 


Glucose   151 H   ()  mg/dL


 


Lactic Acid    1.70  (0.7-2.0)  mmol/L


 


Calcium   9.0   (8.4-10.2)  mg/dL


 


Magnesium     (1.7-2.3)  mg/dL


 


Total Bilirubin   2.00 H   (0.1-1.2)  mg/dL


 


AST   70 H   (5-40)  units/L


 


ALT   84 H   (7-56)  units/L


 


Alkaline Phosphatase   133 H   ()  units/L


 


Troponin T   < 0.010   (0.00-0.029)  ng/mL


 


Total Protein   7.2   (6.3-8.2)  g/dL


 


Albumin   3.4 L   (3.9-5)  g/dL


 


Albumin/Globulin Ratio   0.9   %














  19 Range/Units





  13:59 


 


WBC   (4.5-11.0)  K/mm3


 


RBC   (3.65-5.03)  M/mm3


 


Hgb   (10.1-14.3)  gm/dl


 


Hct   (30.3-42.9)  %


 


MCV   (79-97)  fl


 


MCH   (28-32)  pg


 


MCHC   (30-34)  %


 


RDW   (13.2-15.2)  %


 


Plt Count   (140-440)  K/mm3


 


Lymph % (Auto)   (13.4-35.0)  %


 


Mono % (Auto)   (0.0-7.3)  %


 


Eos % (Auto)   (0.0-4.3)  %


 


Baso % (Auto)   (0.0-1.8)  %


 


Lymph #   (1.2-5.4)  K/mm3


 


Mono #   (0.0-0.8)  K/mm3


 


Eos #   (0.0-0.4)  K/mm3


 


Baso #   (0.0-0.1)  K/mm3


 


Seg Neutrophils %   (40.0-70.0)  %


 


Seg Neutrophils #   (1.8-7.7)  K/mm3


 


Sodium   (137-145)  mmol/L


 


Potassium   (3.6-5.0)  mmol/L


 


Chloride   ()  mmol/L


 


Carbon Dioxide   (22-30)  mmol/L


 


Anion Gap   mmol/L


 


BUN   (7-17)  mg/dL


 


Creatinine   (0.7-1.2)  mg/dL


 


Estimated GFR   ml/min


 


BUN/Creatinine Ratio   %


 


Glucose   ()  mg/dL


 


Lactic Acid   (0.7-2.0)  mmol/L


 


Calcium   (8.4-10.2)  mg/dL


 


Magnesium  1.60 L  (1.7-2.3)  mg/dL


 


Total Bilirubin   (0.1-1.2)  mg/dL


 


AST   (5-40)  units/L


 


ALT   (7-56)  units/L


 


Alkaline Phosphatase   ()  units/L


 


Troponin T   (0.00-0.029)  ng/mL


 


Total Protein   (6.3-8.2)  g/dL


 


Albumin   (3.9-5)  g/dL


 


Albumin/Globulin Ratio   %














- EKG Data


-: EKG Interpreted by Me


EKG shows normal: sinus rhythm


Rate: normal





- EKG Data





19 16:28








EKG shows a sinus rhythm, 73 bpm, normal axis, atrial enlargement, left v

entricular hypertrophy, poor R wave progression in V2, abnormal EKG, not 

consistent with ST elevation myocardial infarction.














- Radiology Data


Radiology results: report reviewed, image reviewed





Print Report











Referring Physician: DEREJE CARSON 


 


Patient Name: CHON FERNANDES 


 


Patient ID: [id number] 


 


YOB: 1955 


 


Sex: Female 


 


Accession: [id number] 


 


Report Date: 2019 


 


Report Status: Finalized 


 


Findings


[address] [address] 

XRay Report Signed Patient: CHON FERNANDES MR#: [id number] : 1955

Acct:[id number] Age/Sex: 63 / F ADM Date: 19 Loc: ED Attending Dr: CALLIE sampson Physician: ROSA M DEAL Date of Service: 19 Procedure(s): XR

chest routine 2V Accession Number(s): [id number] cc: ROSA M DEAL Fluoro Time

In Minutes: PA AND LATERAL CXR HISTORY: Cough. COMPARISON: 2019 FINDINGS: 

Cardiomediastinal silhouette: Normal cardiac size. Normal mediastinal contours. 

Lungs: Normal expansion. Normal lung aeration. No pleural effusions. No pneumot

horax. Pulmonary vascularity: Normal. Support hardware: None. Additional 

findings: None. IMPRESSION: 1. No acute cardiopulmonary process. Signer Name: 

Juan Landeros MD Signed: 2019 2:59 PM Workstation Name: [id number] 

Transcribed By: REF Dictated By: JUAN LANDEROS MD Electronically Authe

nticated By: JUAN LANDEROS MD Signed Date/Time: 19 [id number]   

















Print Report











Referring Physician: YUE PANDEY 


 


Patient Name: CHON FERNANDES 


 


Patient ID: [id number] 


 


YOB: 1955 


 


Sex: Female 


 


Accession: [id number] 


 


Report Date: 2019 


 


Report Status: Finalized 


 


Findings


[address] [address] Cat

Scan Report Signed Patient: CHON FERNANDES MR#: [id number] : 1955

Acct:[id number] Age/Sex: 63 / F ADM Date: 19 Loc: ED Attending Dr: 

Ordering Physician: YUE PANDEY MD Date of Service: 19 Procedure(s):

CT chest wo con Accession Number(s): [id number] cc: YUE PANDEY MD CT chest 

wo con INDICATION: cough wheezing hemoptysis. TECHNIQUE: All CT scans at this 

location are performed using the following dose modulation technique: Automated 

exposure control. CONTRAST: Chest x-ray 2019. COMPARISON: Chest x-ray 

earlier the same day. FINDINGS: Bronchial thickening is greatest dependently. 

There are nodular infiltrates at the bases dependently and to a lesser extent 

within the right middle lobe. Negative for dominant mass or pleural fluid. 

Negative for mediastinal mass or significant adenopathy. Imaging of the upper 

abdomen demonstrates previous cholecystectomy. The spleen is only partially 

imaged but appears enlarged. 








IMPRESSION: 1. Nodular infiltrate at the lung bases appears inflammatory. 

Atypical organisms should be included in the differential. 2. Suspected 

splenomegaly. Signer Name: En García MD Signed: 2019 7:04 PM 

Workstation Name: [id number] Transcribed By: ES Dictated By: En García MD Electronically Authenticated By: En García MD Signed 

Date/Time: 19   











Critical care attestation.: 


If time is entered above; I have spent that time in minutes in the direct care 

of this critically ill patient, excluding procedure time.








ED Disposition


Clinical Impression: 


 Bronchitis





Disposition: DC-01 TO HOME OR SELFCARE


Is pt being admited?: No


Does the pt Need Aspirin: No


Condition: Stable


Instructions:  Acute Bronchitis (ED)


Additional Instructions: 


Avoid exposure to tobacco smoke, and other forms of inhalational smoke.








Cultures were sent today, and results will be available in the next 3-5 days.  

Have a primary care doctor contact the medical records department to obtain 

culture results.





Take the magnesium supplementation as directed, albuterol as needed/directed, 

and levofloxacin antibiotic for the next 10 days as directed.  Avoid consumption

of alcohol.  Follow up with the pulmonary specialist within the next 10-14 days.

 Alternatively, patient may follow up with any of the listed primary care doctor

within the next 10-14 days.  Patient likely has bronchitis, which may be 

expected to last anywhere from 4-6 weeks.  There is typically no cure for bronch

itis.





Local pulmonary specialist include the following Echo: Melia Teran Orjioke








Return to the emergency room right away with new, worse or different symptoms, 

or symptoms not present on the initial emergency room evaluation.

















Referrals: 


[facility], MD [Primary Care Provider] - 3-5 Days


SHEEBA HAHN MD [Staff Physician] - 3-5 Days


KATHY GAUTHIER MD [Staff Physician] - 3-5 Days


HERIBERTO TERAN MD [Staff Physician] - 3-5 Days


[facility] [Provider Group] - 3-5 Days

## 2019-07-08 NOTE — CAT SCAN REPORT
CT chest wo con



INDICATION:  cough wheezing hemoptysis.



TECHNIQUE:

All CT scans at this location are performed using the following dose modulation technique: Automated 
exposure control.



CONTRAST:  Chest x-ray 7/8/2019.



COMPARISON: Chest x-ray earlier the same day.



FINDINGS: Bronchial thickening is greatest dependently. There are nodular infiltrates at the bases de
pendently and to a lesser extent within the right middle lobe. Negative for dominant mass or pleural 
fluid.



Negative for mediastinal mass or significant adenopathy.



Imaging of the upper abdomen demonstrates previous cholecystectomy. The spleen is only partially imag
ed but appears enlarged.



IMPRESSION:

1. Nodular infiltrate at the lung bases appears inflammatory. Atypical organisms should be included i
n the differential.

2. Suspected splenomegaly. 



Signer Name: En García MD 

Signed: 7/8/2019 7:04 PM

 Workstation Name: [id number]

## 2019-08-28 ENCOUNTER — HOSPITAL ENCOUNTER (EMERGENCY)
Dept: HOSPITAL 5 - ED | Age: 64
Discharge: HOME | End: 2019-08-28
Payer: MEDICAID

## 2019-08-28 VITALS — SYSTOLIC BLOOD PRESSURE: 148 MMHG | DIASTOLIC BLOOD PRESSURE: 78 MMHG

## 2019-08-28 DIAGNOSIS — Z79.899: ICD-10-CM

## 2019-08-28 DIAGNOSIS — Z87.891: ICD-10-CM

## 2019-08-28 DIAGNOSIS — Z90.49: ICD-10-CM

## 2019-08-28 DIAGNOSIS — Z86.19: ICD-10-CM

## 2019-08-28 DIAGNOSIS — J20.9: Primary | ICD-10-CM

## 2019-08-28 DIAGNOSIS — M79.7: ICD-10-CM

## 2019-08-28 DIAGNOSIS — I10: ICD-10-CM

## 2019-08-28 LAB
BUN SERPL-MCNC: 14 MG/DL (ref 7–17)
BUN/CREAT SERPL: 23 %
CALCIUM SERPL-MCNC: 9 MG/DL (ref 8.4–10.2)
HCT VFR BLD CALC: 49.8 % (ref 30.3–42.9)
HEMOLYSIS INDEX: 9
HGB BLD-MCNC: 17 GM/DL (ref 10.1–14.3)
MCHC RBC AUTO-ENTMCNC: 34 % (ref 30–34)
MCV RBC AUTO: 93 FL (ref 79–97)
PLATELET # BLD: 68 K/MM3 (ref 140–440)
RBC # BLD AUTO: 5.39 M/MM3 (ref 3.65–5.03)

## 2019-08-28 PROCEDURE — 99283 EMERGENCY DEPT VISIT LOW MDM: CPT

## 2019-08-28 PROCEDURE — 85025 COMPLETE CBC W/AUTO DIFF WBC: CPT

## 2019-08-28 PROCEDURE — 36415 COLL VENOUS BLD VENIPUNCTURE: CPT

## 2019-08-28 PROCEDURE — 71046 X-RAY EXAM CHEST 2 VIEWS: CPT

## 2019-08-28 PROCEDURE — 82140 ASSAY OF AMMONIA: CPT

## 2019-08-28 PROCEDURE — 80048 BASIC METABOLIC PNL TOTAL CA: CPT

## 2019-08-28 NOTE — XRAY REPORT
CHEST 2 VIEWS



INDICATION / CLINICAL INFORMATION:

Dyspnea.



COMPARISON: 

None available.



FINDINGS:



SUPPORT DEVICES: None.

HEART / MEDIASTINUM: No significant abnormality. 

LUNGS / PLEURA: No significant pulmonary or pleural abnormality. No pneumothorax. 

ADDITIONAL FINDINGS: No significant additional findings.



IMPRESSION: Minimal chronic interstitial lung disease without acute abnormality or significant change
.



Signer Name: Yoel Hancock MD 

Signed: 8/28/2019 4:52 PM

 Workstation Name: [id number]

## 2019-08-28 NOTE — EMERGENCY DEPARTMENT REPORT
ED Shortness of Breath HPI





- General


Chief Complaint: Dyspnea/Respdistress


Stated Complaint: SOB


Time Seen by Provider: 08/28/19 14:36


Source: patient


Mode of arrival: Ambulatory


Limitations: No Limitations





- History of Present Illness


MD Complaint: shortness of breath, cough (greenish sputum)


-: week(s) (1)


Severity: moderate


Consistency: intermittent


Context: recent URI





- Related Data


                                Home Medications











 Medication  Instructions  Recorded  Confirmed  Last Taken


 


Mv-Mn/Folic Acid/Calcium/Vit K 1 each PO DAILY 07/08/19 07/08/19 Unknown





[Women's 50 Plus Multivit Tab]    


 


Potassium 99 mg PO QDAY 07/08/19 07/08/19 Unknown








                                  Previous Rx's











 Medication  Instructions  Recorded  Last Taken  Type


 


Albuterol Sulfate [Proair 90 mcg IH Q4HR PRN #2 aer.pow.ba 07/08/19 Unknown Rx





Respiclick]    


 


Magnesium Oxide [Mag-Ox] 400 mg PO BID #28 tablet 07/08/19 Unknown Rx


 


levoFLOXacin [Levaquin] 750 mg PO QDAY #10 tablet 07/08/19 Unknown Rx











                                    Allergies











Allergy/AdvReac Type Severity Reaction Status Date / Time


 


No Known Allergies Allergy   Verified 08/28/19 14:27














ED Review of Systems


ROS: 


Stated complaint: SOB


Other details as noted in HPI





Comment: All other systems reviewed and negative


Constitutional: denies: chills, fever


Respiratory: cough, shortness of breath, wheezing.  denies: orthopnea, SOB with 

exertion, SOB at rest, stridor


Cardiovascular: denies: chest pain, palpitations


Gastrointestinal: denies: abdominal pain, nausea, vomiting, diarrhea, 

constipation, hematemesis, melena, hematochezia





ED Past Medical Hx





- Past Medical History


Previous Medical History?: Yes


Hx Hypertension: Yes


Hx Liver Disease: Yes (HEPATITIS C)


Hx Seizures: Yes


Additional medical history: fibromyalgia





- Surgical History


Past Surgical History?: Yes


Hx Cholecystectomy: Yes


Additional Surgical History: right arm surgery





- Social History


Smoking Status: Former Smoker


Substance Use Type: None





- Medications


Home Medications: 


                                Home Medications











 Medication  Instructions  Recorded  Confirmed  Last Taken  Type


 


Albuterol Sulfate [Proair 90 mcg IH Q4HR PRN #2 aer.pow.ba 07/08/19  Unknown Rx





Respiclick]     


 


Magnesium Oxide [Mag-Ox] 400 mg PO BID #28 tablet 07/08/19  Unknown Rx


 


Mv-Mn/Folic Acid/Calcium/Vit K 1 each PO DAILY 07/08/19 07/08/19 Unknown History





[Women's 50 Plus Multivit Tab]     


 


Potassium 99 mg PO QDAY 07/08/19 07/08/19 Unknown History


 


levoFLOXacin [Levaquin] 750 mg PO QDAY #10 tablet 07/08/19  Unknown Rx














ED Physical Exam





- General


Limitations: No Limitations


General appearance: alert, in no apparent distress





- Head


Head exam: Present: atraumatic, normocephalic, normal inspection





- Eye


Eye exam: Present: normal appearance, PERRL





- ENT


ENT exam: Present: normal exam, normal orophraynx, mucous membranes moist





- Neck


Neck exam: Present: normal inspection, full ROM.  Absent: tenderness, 

meningismus, lymphadenopathy, thyromegaly





- Respiratory


Respiratory exam: Present: normal lung sounds bilaterally, wheezes (mild)





- Cardiovascular


Cardiovascular Exam: Present: regular rate, normal rhythm, normal heart sounds





- GI/Abdominal


GI/Abdominal exam: Present: soft, normal bowel sounds.  Absent: distended, 

tenderness, guarding, rebound, rigid, organomegaly, mass, bruit, pulsatile mass,

 hernia





- Extremities Exam


Extremities exam: Present: normal inspection, full ROM, normal capillary refill.

  Absent: pedal edema, calf tenderness





- Back Exam


Back exam: Present: normal inspection, full ROM.  Absent: CVA tenderness (R), 

CVA tenderness (L), muscle spasm, paraspinal tenderness, vertebral tenderness





- Neurological Exam


Neurological exam: Present: alert, oriented X3, CN II-XII intact, normal gait, 

reflexes normal





- Skin


Skin exam: Present: warm, intact, normal color





ED Course





                                   Vital Signs











  08/28/19 08/28/19 08/28/19





  14:37 17:40 17:41


 


Temperature 98 F  


 


Pulse Rate 68 57 L 


 


Respiratory 20 16 16





Rate   


 


Blood Pressure 185/94  


 


Blood Pressure  149/68 





[Right]   


 


O2 Sat by Pulse 98 97 





Oximetry   














ED Medical Decision Making





- Lab Data


Result diagrams: 


                                 08/28/19 16:29





                                 08/28/19 14:47





- Radiology Data


Radiology results: report reviewed





CXR IS UNREMARKABLE.


Critical care attestation.: 


If time is entered above; I have spent that time in minutes in the direct care 

of this critically ill patient, excluding procedure time.








ED Disposition


Clinical Impression: 


 Acute bronchitis





Disposition: DC-01 TO HOME OR SELFCARE


Is pt being admited?: No


Condition: Stable


Instructions:  Acute Bronchitis (ED)


Referrals: 


PCP,FOLLOW UP, MD [Primary Care Provider] - 3-5 Days
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
## 2019-10-26 ENCOUNTER — HOSPITAL ENCOUNTER (EMERGENCY)
Dept: HOSPITAL 5 - ED | Age: 64
Discharge: HOME | End: 2019-10-26
Payer: MEDICAID

## 2019-10-26 VITALS — DIASTOLIC BLOOD PRESSURE: 79 MMHG | SYSTOLIC BLOOD PRESSURE: 147 MMHG
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
## 2019-10-26 DIAGNOSIS — Z98.890: ICD-10-CM

## 2019-10-26 DIAGNOSIS — Z90.49: ICD-10-CM
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
## 2019-10-26 DIAGNOSIS — B19.20: ICD-10-CM

## 2019-10-26 DIAGNOSIS — S16.1XXA: Primary | ICD-10-CM

## 2019-10-26 DIAGNOSIS — M51.37: ICD-10-CM

## 2019-10-26 DIAGNOSIS — F17.200: ICD-10-CM

## 2019-10-26 DIAGNOSIS — S39.012A: ICD-10-CM

## 2019-10-26 DIAGNOSIS — M79.7: ICD-10-CM

## 2019-10-26 DIAGNOSIS — Z79.899: ICD-10-CM

## 2019-10-26 DIAGNOSIS — I10: ICD-10-CM

## 2019-10-26 PROCEDURE — 72100 X-RAY EXAM L-S SPINE 2/3 VWS: CPT

## 2019-10-26 PROCEDURE — 72040 X-RAY EXAM NECK SPINE 2-3 VW: CPT
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
## 2022-02-18 ENCOUNTER — HOSPITAL ENCOUNTER (EMERGENCY)
Dept: HOSPITAL 5 - ED | Age: 67
LOS: 1 days | Discharge: HOME | End: 2022-02-19
Payer: MEDICAID

## 2022-02-18 DIAGNOSIS — R51.9: ICD-10-CM

## 2022-02-18 DIAGNOSIS — F17.200: ICD-10-CM

## 2022-02-18 DIAGNOSIS — R11.2: Primary | ICD-10-CM

## 2022-02-18 DIAGNOSIS — I10: ICD-10-CM

## 2022-02-18 PROCEDURE — 36415 COLL VENOUS BLD VENIPUNCTURE: CPT

## 2022-02-18 PROCEDURE — 85025 COMPLETE CBC W/AUTO DIFF WBC: CPT

## 2022-02-18 PROCEDURE — 71046 X-RAY EXAM CHEST 2 VIEWS: CPT

## 2022-02-18 PROCEDURE — 84484 ASSAY OF TROPONIN QUANT: CPT

## 2022-02-18 PROCEDURE — 96372 THER/PROPH/DIAG INJ SC/IM: CPT

## 2022-02-18 PROCEDURE — 99284 EMERGENCY DEPT VISIT MOD MDM: CPT

## 2022-02-18 PROCEDURE — 80053 COMPREHEN METABOLIC PANEL: CPT

## 2022-02-18 PROCEDURE — 93010 ELECTROCARDIOGRAM REPORT: CPT

## 2022-02-18 PROCEDURE — 93005 ELECTROCARDIOGRAM TRACING: CPT

## 2022-02-18 PROCEDURE — 99283 EMERGENCY DEPT VISIT LOW MDM: CPT

## 2022-02-19 VITALS — DIASTOLIC BLOOD PRESSURE: 81 MMHG | SYSTOLIC BLOOD PRESSURE: 156 MMHG

## 2022-02-19 LAB
ALBUMIN SERPL-MCNC: (no result) G/DL (ref 3.9–5)
ALBUMIN SERPL-MCNC: 3.9 G/DL (ref 3.9–5)
ALT SERPL-CCNC: (no result) UNITS/L (ref 7–56)
ALT SERPL-CCNC: 129 UNITS/L (ref 7–56)
BASOPHILS # (AUTO): 0 K/MM3 (ref 0–0.1)
BASOPHILS NFR BLD AUTO: 0.5 % (ref 0–1.8)
BUN SERPL-MCNC: (no result) MG/DL (ref 7–17)
BUN SERPL-MCNC: 10 MG/DL (ref 7–17)
BUN/CREAT SERPL: (no result) %
BUN/CREAT SERPL: 25 %
CALCIUM SERPL-MCNC: (no result) MG/DL (ref 8.4–10.2)
CALCIUM SERPL-MCNC: 9.6 MG/DL (ref 8.4–10.2)
EOSINOPHIL # BLD AUTO: 0 K/MM3 (ref 0–0.4)
EOSINOPHIL NFR BLD AUTO: 0.2 % (ref 0–4.3)
HCT VFR BLD CALC: 47.2 % (ref 30.3–42.9)
HEMOLYSIS INDEX: 476
HEMOLYSIS INDEX: 630
HGB BLD-MCNC: 15.7 GM/DL (ref 10.1–14.3)
LYMPHOCYTES # BLD AUTO: 1.9 K/MM3 (ref 1.2–5.4)
LYMPHOCYTES NFR BLD AUTO: 19.7 % (ref 13.4–35)
MCHC RBC AUTO-ENTMCNC: 33 % (ref 30–34)
MCV RBC AUTO: 90 FL (ref 79–97)
MONOCYTES # (AUTO): 0.5 K/MM3 (ref 0–0.8)
MONOCYTES % (AUTO): 5.5 % (ref 0–7.3)
PLATELET # BLD: 100 K/MM3 (ref 140–440)
RBC # BLD AUTO: 5.23 M/MM3 (ref 3.65–5.03)

## 2022-02-19 NOTE — XRAY REPORT
XR chest routine 2V



INDICATION / CLINICAL INFORMATION: chest pain.



COMPARISON: 8/28/2019



FINDINGS:



SUPPORT DEVICES: None.

HEART /PULMONARY VASCULATURE: No significant abnormality. 

LUNGS / PLEURA: No significant pulmonary or pleural abnormality. No pneumothorax. 



ADDITIONAL FINDINGS: No significant additional findings.



IMPRESSION:

1. No acute findings.



Signer Name: Michael Adam MD 

Signed: 2/19/2022 12:05 AM

Workstation Name: [id number]

## 2022-02-19 NOTE — EMERGENCY DEPARTMENT REPORT
ED N/V/D HPI





- General


Chief complaint: Nausea/Vomiting/Diarrhea


Stated complaint: VOMITING


Time Seen by Provider: 02/18/22 22:41


Source: patient


Mode of arrival: Ambulatory


Limitations: No Limitations





- History of Present Illness


Initial comments: 





66-year-old white female with a past medical history of hypertension, 

fibromyalgia, and hepatitis C presents to the emergency department with 3-day 

history of persistent nausea, vomiting, headache, right-sided chest pain, and 

abdominal cramps. She denies fever, dysuria, or any sick contacts. She states th

at nausea vomiting started out of nowhere, then after 2 days of nausea vomiting 

she developed pain to her right chest when she pressed on it only along with 

severe headache. She states that she has not taken any medications at home.


MD complaint: nausea, vomiting, abdominal pain


-: Gradual, days(s) (3)


Description of Vomiting: bilious


Description of Diarrhea: water


Associated Abdominal Pain: Yes


Location: diffuse


Radiation: none


Severity: severe


Pain Scale: 8


Quality: cramping


Consistency: intermittent


Associated Symptoms: chest pain, headaches, loss of appetite, nausea/vomiting.  

denies: myalgias, cough, diaphoresis, fever/chills, malaise, rash, dysuria, 

shortness of breath, syncope, weakness





- Related Data


                                Home Medications











 Medication  Instructions  Recorded  Confirmed  Last Taken


 


Mv-Mn/Folic Acid/Calcium/Vit K 1 each PO DAILY 07/08/19 07/08/19 Unknown





[Women's 50 Plus Multivit Tab]    


 


Potassium 99 mg PO QDAY 07/08/19 07/08/19 Unknown








                                  Previous Rx's











 Medication  Instructions  Recorded  Last Taken  Type


 


Albuterol Sulfate [Proair 90 mcg IH Q4HR PRN #2 aer.pow.ba 07/08/19 Unknown Rx





Respiclick]    


 


Magnesium Oxide [Mag-Ox] 400 mg PO BID #28 tablet 07/08/19 Unknown Rx


 


levoFLOXacin [Levaquin] 750 mg PO QDAY #10 tablet 07/08/19 Unknown Rx


 


Albuterol Mdi (or & Nicu Only) 2 puff IH QID PRN #1 inhalation 08/28/19 Unknown 

Rx





[ProAir HFA Inhaler]    


 


guaiFENesin/CODEINE [Robitussin AC] 10 ml PO TID PRN #100 ml 08/28/19 Unknown Rx


 


levoFLOXacin [Levaquin TAB] 500 mg PO QDAY #7 tablet 08/28/19 Unknown Rx


 


Baclofen [Lioresal] 10 mg PO QHS PRN #10 tab 10/26/19 Unknown Rx


 


Naproxen [EC-Naproxen] 500 mg PO BID PRN #14 tablet.dr 10/26/19 Unknown Rx


 


Butalb/Acetaminophen/Caffeine 1 cap PO Q8HR PRN #15 cap 02/19/22 Unknown Rx





[Fioricet -40 mg CAP]    


 


Dicyclomine [Bentyl] 20 mg PO QID PRN #21 tablet 02/19/22 Unknown Rx


 


Ondansetron [Zofran Odt] 4 mg PO Q8HR PRN #12 tab.rapdis 02/19/22 Unknown Rx











                                    Allergies











Allergy/AdvReac Type Severity Reaction Status Date / Time


 


No Known Allergies Allergy   Verified 08/28/19 14:27














ED Review of Systems


ROS: 


Stated complaint: VOMITING


Other details as noted in HPI





Comment: All other systems reviewed and negative


Constitutional: denies: chills, diaphoresis, fever, malaise, weakness


Eyes: denies: eye pain, eye discharge


ENT: denies: ear pain


Respiratory: denies: cough, orthopnea, shortness of breath, SOB with exertion, 

SOB at rest, wheezing


Cardiovascular: chest pain.  denies: palpitations, dyspnea on exertion, 

orthopnea, edema, syncope, paroxysmal nocturnal dyspnea


Endocrine: no symptoms reported


Gastrointestinal: abdominal pain, nausea, vomiting.  denies: diarrhea, consti

pation, hematemesis, melena, hematochezia


Musculoskeletal: denies: back pain, joint swelling, arthralgia, myalgia


Skin: denies: rash, lesions


Neurological: headache.  denies: weakness, numbness, paresthesias, abnormal gait


Psychiatric: denies: anxiety, depression


Hematological/Lymphatic: denies: easy bleeding, easy bruising





ED Past Medical Hx





- Past Medical History


Previous Medical History?: Yes


Hx Hypertension: Yes


Hx Liver Disease: Yes (HEPATITIS C)


Hx Seizures: Yes


Additional medical history: fibromyalgia





- Surgical History


Past Surgical History?: Yes


Hx Cholecystectomy: Yes


Additional Surgical History: right arm surgery





- Social History


Smoking Status: Current Every Day Smoker


Substance Use Type: Prescribed





- Medications


Home Medications: 


                                Home Medications











 Medication  Instructions  Recorded  Confirmed  Last Taken  Type


 


Albuterol Sulfate [Proair 90 mcg IH Q4HR PRN #2 aer.pow.ba 07/08/19  Unknown Rx





Respiclick]     


 


Magnesium Oxide [Mag-Ox] 400 mg PO BID #28 tablet 07/08/19  Unknown Rx


 


Mv-Mn/Folic Acid/Calcium/Vit K 1 each PO DAILY 07/08/19 07/08/19 Unknown History





[Women's 50 Plus Multivit Tab]     


 


Potassium 99 mg PO QDAY 07/08/19 07/08/19 Unknown History


 


levoFLOXacin [Levaquin] 750 mg PO QDAY #10 tablet 07/08/19  Unknown Rx


 


Albuterol Mdi (or & Nicu Only) 2 puff IH QID PRN #1 inhalation 08/28/19  Unknown

Rx





[ProAir HFA Inhaler]     


 


guaiFENesin/CODEINE [Robitussin AC] 10 ml PO TID PRN #100 ml 08/28/19  Unknown 

Rx


 


levoFLOXacin [Levaquin TAB] 500 mg PO QDAY #7 tablet 08/28/19  Unknown Rx


 


Baclofen [Lioresal] 10 mg PO QHS PRN #10 tab 10/26/19  Unknown Rx


 


Naproxen [EC-Naproxen] 500 mg PO BID PRN #14 tablet.dr 10/26/19  Unknown Rx


 


Butalb/Acetaminophen/Caffeine 1 cap PO Q8HR PRN #15 cap 02/19/22  Unknown Rx





[Fioricet -40 mg CAP]     


 


Dicyclomine [Bentyl] 20 mg PO QID PRN #21 tablet 02/19/22  Unknown Rx


 


Ondansetron [Zofran Odt] 4 mg PO Q8HR PRN #12 tab.rapdis 02/19/22  Unknown Rx














ED Physical Exam





- General


Limitations: No Limitations


General appearance: alert, in no apparent distress





- Head


Head exam: Present: atraumatic, normocephalic





- Eye


Eye exam: Present: normal appearance.  Absent: conjunctival injection





- Neck


Neck exam: Present: normal inspection, full ROM.  Absent: tenderness





- Respiratory


Respiratory exam: Present: normal lung sounds bilaterally, chest wall 

tenderness.  Absent: respiratory distress, wheezes, rales, rhonchi, stridor, 

accessory muscle use, decreased breath sounds





- Cardiovascular


Cardiovascular Exam: Present: regular rate, normal rhythm, normal heart sounds





- GI/Abdominal


GI/Abdominal exam: Present: soft, distended, tenderness, normal bowel sounds.  

Absent: guarding (Diffuse), rebound, rigid





- Extremities Exam


Extremities exam: Present: normal inspection, full ROM





- Back Exam


Back exam: Present: normal inspection, full ROM.  Absent: tenderness, CVA 

tenderness (R), CVA tenderness (L), paraspinal tenderness, vertebral tenderness





- Neurological Exam


Neurological exam: Present: alert, oriented X3





- Psychiatric


Psychiatric exam: Present: normal affect, normal mood





- Skin


Skin exam: Present: warm, dry, intact, normal color





ED Course


                                   Vital Signs











  02/18/22 02/19/22





  21:13 02:43


 


Temperature 98.5 F 


 


Pulse Rate 95 H 70


 


Respiratory 18 18





Rate  


 


Blood Pressure 218/105 


 


Blood Pressure  156/81





[Right]  


 


O2 Sat by Pulse 93 98





Oximetry  














- Reevaluation(s)


Reevaluation #1: 





02/19/22 02:44


Patient states that nausea vomiting headache and chest pain has resolved.  Lab 

states that they were unable to run blue for the second time because specimen 

was hemolyzed again.   states that results on chart are not 

accurate because the specimen hemolyzed.  She states that patient needs to be 

redrawn.  Discussed redrawing with patient, and she states that she does not 

want blood redrawn.  She states that she is ready to be discharged to follow-up 

with the primary care doctor.





ED Medical Decision Making





- Lab Data


Result diagrams: 


                                 02/18/22 23:56





                                 02/19/22 00:54





- EKG Data


EKG shows normal: sinus rhythm


Rate: normal





- EKG Data


When compared to previous EKG there are: previous EKG unavailable


Interpretation: no acute changes, normal EKG





02/19/22 03:32


No acute ischemic changes noted





- Radiology Data


Radiology results: report reviewed, image reviewed





Chest x-ray


 IMPRESSION:  


 1. No acute findings.  





- Medical Decision Making


66-year-old white female with a past medical history of hypertension, fibromyal

vikram, and hepatitis C presents to the emergency department with 3-day history of 

persistent nausea, vomiting, headache, right-sided chest pain, and abdominal 

cramps. She denies fever, dysuria, or any sick contacts. She states that nausea 

vomiting started out of nowhere, then after 2 days of nausea vomiting she 

developed pain to her right chest when she pressed on it only along with severe 

headache. She states that she has not taken any medications at home.





Chest x-ray without any acute findings. CBC with WBC within normal limits. 

Patient refused to be redrawn for lab for the third time. EKG without any 

ischemic changes noted.  stated that sample hemolyzed twice and 

she could not verify results in the computer because sample clotted completely. 

Nausea vomiting headache abdominal cramping and chest pain were all resolved 

after medications. Patient also refused to give urine sample. She requested to 

be discharged home without redraw or urine sample and information on primary 

care provider to follow-up with. Results that were obtained were discussed with 

patient. And she was advised to follow-up with primary care provider. She was 

also advised to monitor and record her blood pressure over the next few days and

 take recordings to her primary care provider to follow-up on elevated blood 

pressure. She verbalized understanding of and agreement with plan of care. No 

acute distress noted. Patient states that she felt much better.





Critical care attestation.: 


If time is entered above; I have spent that time in minutes in the direct care 

of this critically ill patient, excluding procedure time.








ED Disposition


Clinical Impression: 


Nausea & vomiting


Qualifiers:


 Vomiting type: unspecified Qualified Code(s): R11.2 - Nausea with vomiting, 

unspecified





Headache


Qualifiers:


 Headache type: unspecified Headache chronicity pattern: acute headache 

Intractability: not intractable Qualified Code(s): R51.9 - Headache, unspecified





Disposition: 01 HOME / SELF CARE / HOMELESS


Is pt being admited?: No


Does the pt Need Aspirin: No


Condition: Stable


Instructions:  General Headache Without Cause, Nausea and Vomiting, Adult, 

Easy-to-Read


Additional Instructions: 


Take medications as prescribed.  Follow-up with primary care doctor for further 

evaluation and management of hypertension.  Return to the ED for worsening 

symptoms.


Prescriptions: 


Dicyclomine [Bentyl] 20 mg PO QID PRN #21 tablet


 PRN Reason: Pain, Moderate (4-6)


Butalb/Acetaminophen/Caffeine [Fioricet -40 mg CAP] 1 cap PO Q8HR PRN #15 

cap


 PRN Reason: Headache


Ondansetron [Zofran Odt] 4 mg PO Q8HR PRN #12 tab.rapdis


 PRN Reason: Nausea


Referrals: 


PRIMARY CARE,MD [Primary Care Provider] - 3-5 Days


RAFY GRULLON MD [Referring] - 3-5 Days


Time of Disposition: 02:49

## 2022-02-23 NOTE — ELECTROCARDIOGRAPH REPORT
[facility]

                                       

Test Date:    2022               Test Time:    23:05:16

Pat Name:     CHON FERNANDES          Department:   

Patient ID:   [id number]          Room:          

Gender:       F                        Technician:   ADRIAN

:          [id number]               Requested By: LEESA OCASIO

Order Number: [id number]              Reading MD:   Sergio Fabian

                                 Measurements

Intervals                              Axis          

Rate:         94                       P:            77

OR:           242                      QRS:          59

QRSD:         88                       T:            59

QT:           376                                    

QTc:          469                                    

                           Interpretive Statements

Sinus rhythm

Multiple ventricular premature complexes

Prolonged OR interval

Anterior infarct, old

No previous ECG available for comparison

Electronically Signed On 2022 17:07:00 EST by Sergio Fabian

## 2022-07-19 ENCOUNTER — HOSPITAL ENCOUNTER (EMERGENCY)
Dept: HOSPITAL 5 - ED | Age: 67
Discharge: HOME | End: 2022-07-19
Payer: MEDICARE

## 2022-07-19 VITALS — DIASTOLIC BLOOD PRESSURE: 94 MMHG | SYSTOLIC BLOOD PRESSURE: 148 MMHG
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
## 2022-07-19 DIAGNOSIS — R56.9: ICD-10-CM
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
## 2022-07-19 DIAGNOSIS — F17.200: ICD-10-CM

## 2022-07-19 DIAGNOSIS — Z79.899: ICD-10-CM

## 2022-07-19 DIAGNOSIS — T24.232A: Primary | ICD-10-CM
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
## 2022-07-19 DIAGNOSIS — I10: ICD-10-CM
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
## 2022-07-19 DIAGNOSIS — K76.9: ICD-10-CM

## 2022-07-19 DIAGNOSIS — Z90.49: ICD-10-CM

## 2022-07-19 PROCEDURE — 96372 THER/PROPH/DIAG INJ SC/IM: CPT

## 2022-07-19 PROCEDURE — 16000 INITIAL TREATMENT OF BURN(S): CPT

## 2022-07-19 PROCEDURE — 99283 EMERGENCY DEPT VISIT LOW MDM: CPT

## 2022-07-19 PROCEDURE — 90471 IMMUNIZATION ADMIN: CPT

## 2022-07-19 PROCEDURE — 99282 EMERGENCY DEPT VISIT SF MDM: CPT

## 2022-07-19 PROCEDURE — 90715 TDAP VACCINE 7 YRS/> IM: CPT
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
